# Patient Record
Sex: MALE | Race: WHITE | NOT HISPANIC OR LATINO | Employment: UNEMPLOYED | URBAN - METROPOLITAN AREA
[De-identification: names, ages, dates, MRNs, and addresses within clinical notes are randomized per-mention and may not be internally consistent; named-entity substitution may affect disease eponyms.]

---

## 2022-08-27 ENCOUNTER — OFFICE VISIT (OUTPATIENT)
Dept: URGENT CARE | Facility: URGENT CARE | Age: 47
End: 2022-08-27
Payer: COMMERCIAL

## 2022-08-27 VITALS
DIASTOLIC BLOOD PRESSURE: 82 MMHG | TEMPERATURE: 98.7 F | HEART RATE: 84 BPM | RESPIRATION RATE: 14 BRPM | SYSTOLIC BLOOD PRESSURE: 136 MMHG | OXYGEN SATURATION: 99 %

## 2022-08-27 DIAGNOSIS — H60.391 INFECTIVE OTITIS EXTERNA, RIGHT: Primary | ICD-10-CM

## 2022-08-27 PROCEDURE — 99203 OFFICE O/P NEW LOW 30 MIN: CPT | Performed by: PHYSICIAN ASSISTANT

## 2022-08-27 RX ORDER — NEOMYCIN SULFATE, POLYMYXIN B SULFATE AND HYDROCORTISONE 10; 3.5; 1 MG/ML; MG/ML; [USP'U]/ML
3 SUSPENSION/ DROPS AURICULAR (OTIC) 4 TIMES DAILY
Qty: 10 ML | Refills: 0 | Status: SHIPPED | OUTPATIENT
Start: 2022-08-27 | End: 2023-07-07

## 2022-08-27 NOTE — PROGRESS NOTES
Assessment/Plan:    C/w otitis externa. Rx Cortisporin. Supportive cares discussed, avoid water in the ear.  See patient instructions below.    At the end of the encounter, I discussed results, diagnosis, medications. Discussed red flags for immediate return to clinic/ER, as well as indications for follow up if no improvement. Patient understood and agreed to plan. Patient was stable for discharge.      ICD-10-CM    1. Infective otitis externa, right  H60.391 neomycin-polymyxin-hydrocortisone (CORTISPORIN) 3.5-12788-9 otic suspension         Return in about 3 days (around 8/30/2022).    YOSEPH Lawrence, PA-[facility]  -----------------------------------------------------------------------------------------------------------------------------------------------------    HPI:  Roger Juarez is a 46 year old male who presents for evaluation of R ear pain and decreased hearing onset 2 days ago. He tried acetic acid last night. Patient reports no congestion, ear drainage, fever/chills, headache, nausea, vomiting, diarrhea, rash, or any other symptoms.     Past Medical History:   Diagnosis Date     NO ACTIVE PROBLEMS        Vitals:    08/27/22 1205   BP: 136/82   Pulse: 84   Resp: 14   Temp: 98.7  F (37.1  C)   SpO2: 99%       Physical Exam  Vitals and nursing note reviewed.   HENT:      Right Ear: Drainage (small amount of white material in ear canal), swelling (ear canal, tragus) and tenderness (tragus, auricle) present.      Left Ear: Tympanic membrane and external ear normal.      Ears:      Comments: Unable to visualize right TM due to ear canal swelling/drainage  Pulmonary:      Effort: Pulmonary effort is normal.   Lymphadenopathy:      Head:      Right side of head: Preauricular adenopathy present.   Neurological:      Mental Status: He is alert.         Labs/Imaging:  No results found for this or any previous visit (from the past 24 hour(s)).  No results found for this  or any previous visit (from the past 24 hour(s)).        Patient Instructions   1. Keep ears completely dry during the time that patient is being treated. No swimming. Showering is ok, but keep ears away from the water as much as possible.   2.  Follow-up if no improvement in pain over the course the next 3 to 4 days.  Follow-up sooner if worsening symptoms such as fever develop.  3.  May give Tylenol or ibuprofen as needed for pain control.    External otitis (also called  swimmer s ear ) is an infection in the ear canal. It is often caused by bacteria or fungus. It can occur a few days after water gets trapped in the ear canal (from swimming or bathing). It can also occur after cleaning too deeply in the ear canal with a cotton swab or other object. Sometimes, hair care products get into the ear canal and cause this problem.  Symptoms can include pain, fever, itching, redness, drainage, or swelling of the ear canal. Temporary hearing loss may also occur.  Home care    Do not try to clean the ear canal. This can push pus and bacteria deeper into the canal.    Use prescribed ear drops as directed. These help reduce swelling and fight the infection. If an ear wick was placed in the ear canal, apply drops right onto the end of the wick. The wick will draw the medicine into the ear canal even if it is swollen closed.    A cotton ball may be loosely placed in the outer ear to absorb any drainage.    You may use acetaminophen or ibuprofen to control pain, unless another medicine was prescribed. Note: If you have chronic liver or kidney disease or ever had a stomach ulcer or GI bleeding, talk to your healthcare provider before taking any of these medicines.    Do not allow water to get into your ear when bathing. Also, don't swim until the infection has cleared.  Prevention    Keep your ears dry. This helps lower the risk of infection. Dry your ears with a towel or hair dryer after getting wet. Also, use ear plugs when  swimming.    Do not stick any objects in the ear to remove wax.    If you feel water trapped in your ear, use ear drops right away. You can get these drops over the counter at most drugstores. They work by removing water from the ear canal.  Follow-up care  Follow up with your healthcare provider in 1 week, or as advised.  When to seek medical advice  Call your healthcare provider right away if any of these occur:    Ear pain becomes worse or doesn t improve after 3 days of treatment    Redness or swelling of the outer ear occurs or gets worse    Headache    Painful or stiff neck    Drowsiness or confusion    Fever of 100.4 F (38 C) or higher, or as directed by your healthcare provider    Seizure  Date Last Reviewed: 10/1/2017    5955-1402 [employer]. [address] [place] [address], [place] [address]. All rights reserved. This information is not intended as a substitute for professional medical care. Always follow your healthcare professional's instructions.

## 2022-08-27 NOTE — PATIENT INSTRUCTIONS
Keep ears completely dry during the time that patient is being treated. No swimming. Showering is ok, but keep ears away from the water as much as possible.   2.  Follow-up if no improvement in pain over the course the next 3 to 4 days.  Follow-up sooner if worsening symptoms such as fever develop.  3.  May give Tylenol or ibuprofen as needed for pain control.    External otitis (also called  swimmer s ear ) is an infection in the ear canal. It is often caused by bacteria or fungus. It can occur a few days after water gets trapped in the ear canal (from swimming or bathing). It can also occur after cleaning too deeply in the ear canal with a cotton swab or other object. Sometimes, hair care products get into the ear canal and cause this problem.  Symptoms can include pain, fever, itching, redness, drainage, or swelling of the ear canal. Temporary hearing loss may also occur.  Home care  Do not try to clean the ear canal. This can push pus and bacteria deeper into the canal.  Use prescribed ear drops as directed. These help reduce swelling and fight the infection. If an ear wick was placed in the ear canal, apply drops right onto the end of the wick. The wick will draw the medicine into the ear canal even if it is swollen closed.  A cotton ball may be loosely placed in the outer ear to absorb any drainage.  You may use acetaminophen or ibuprofen to control pain, unless another medicine was prescribed. Note: If you have chronic liver or kidney disease or ever had a stomach ulcer or GI bleeding, talk to your healthcare provider before taking any of these medicines.  Do not allow water to get into your ear when bathing. Also, don't swim until the infection has cleared.  Prevention  Keep your ears dry. This helps lower the risk of infection. Dry your ears with a towel or hair dryer after getting wet. Also, use ear plugs when swimming.  Do not stick any objects in the ear to remove wax.  If you feel water trapped in your  ear, use ear drops right away. You can get these drops over the counter at most drugstores. They work by removing water from the ear canal.  Follow-up care  Follow up with your healthcare provider in 1 week, or as advised.  When to seek medical advice  Call your healthcare provider right away if any of these occur:  Ear pain becomes worse or doesn t improve after 3 days of treatment  Redness or swelling of the outer ear occurs or gets worse  Headache  Painful or stiff neck  Drowsiness or confusion  Fever of 100.4 F (38 C) or higher, or as directed by your healthcare provider  Seizure  Date Last Reviewed: 10/1/2017    2427-9818 [employer] [address] [place] [address], [place] [address]. All rights reserved. This information is not intended as a substitute for professional medical care. Always follow your healthcare professional's instructions.

## 2022-09-02 ENCOUNTER — OFFICE VISIT (OUTPATIENT)
Dept: FAMILY MEDICINE | Facility: CLINIC | Age: 47
End: 2022-09-02
Payer: COMMERCIAL

## 2022-09-02 VITALS
OXYGEN SATURATION: 97 % | HEART RATE: 66 BPM | WEIGHT: 167.7 LBS | TEMPERATURE: 99 F | DIASTOLIC BLOOD PRESSURE: 80 MMHG | SYSTOLIC BLOOD PRESSURE: 124 MMHG | RESPIRATION RATE: 16 BRPM

## 2022-09-02 DIAGNOSIS — B00.1 COLD SORE: ICD-10-CM

## 2022-09-02 DIAGNOSIS — H60.391 INFECTIVE OTITIS EXTERNA, RIGHT: Primary | ICD-10-CM

## 2022-09-02 PROCEDURE — 99213 OFFICE O/P EST LOW 20 MIN: CPT | Performed by: NURSE PRACTITIONER

## 2022-09-02 RX ORDER — VALACYCLOVIR HYDROCHLORIDE 1 G/1
2000 TABLET, FILM COATED ORAL 2 TIMES DAILY
Qty: 4 TABLET | Refills: 3 | Status: SHIPPED | OUTPATIENT
Start: 2022-09-02 | End: 2022-09-03

## 2022-09-02 RX ORDER — CIPROFLOXACIN 500 MG/1
TABLET, FILM COATED ORAL
COMMUNITY
Start: 2022-08-28 | End: 2023-07-07

## 2022-09-02 ASSESSMENT — PAIN SCALES - GENERAL: PAINLEVEL: NO PAIN (0)

## 2022-09-02 NOTE — PATIENT INSTRUCTIONS
Ear canal: use ear drops for one more week    Ear pressure: try ClaritinD (ask the pharmacist for this) and flonase (over the counter nose spray)

## 2022-09-02 NOTE — PROGRESS NOTES
Assessment & Plan     Infective otitis externa, right   note reviewed; at that time canal swelling obstructed view of TM.  Today exam reveals much improved symptoms.  Continues to have mild redness and debris in the canal.  Will have him use drops (previously prescribed) for one more week. Try antihistamine/decongestant & nasal steroid for ear discomfort.  If not resolving can have him follow-up with ENT.     Cold sore  Started 1-2 days ago.  Hasn't had a cold sore in several years; start valtrex.   - valACYclovir (VALTREX) 1000 mg tablet; Take 2 tablets (2,000 mg) by mouth 2 times daily for 1 day           follow-up 3 mos physical     No follow-ups on file.    MARIA LUZ Gillespie CNP  [facility] AYLIN Duran is a 46 year old, presenting for the following health issues:  Ear Problem ( Infective otitis externa, right) and Mouth/Lip Problem (Has a cold sore coming in, requesting a rx for Valtrex)      HPI     ED/ Followup:    Facility:  [facility]  Date of visit: 8/27/2022  Reason for visit: Infective otitis externa, right  Current Status: hasn't cleared up yet  Initially was started on ear drops.  Did the drops for a day, but the next day ear was worse and was switched to oral cipro for 7 days.  Symptoms are better, but still has fullness and maybe swelling in the ear.  Ear feels clogged and has decreased hearing.  Occasional ear pain.       Review of Systems   Constitutional, HEENT, cardiovascular, pulmonary, gi and gu systems are negative, except as otherwise noted.      Objective    /80 (BP Location: Right arm, Patient Position: Sitting, Cuff Size: Adult Regular)   Pulse 66   Temp 99  F (37.2  C) (Oral)   Resp 16   Wt 76.1 kg (167 lb 11.2 oz)   SpO2 97%   There is no height or weight on file to calculate BMI.  Physical Exam   GENERAL: healthy, alert and no distress  EYES: Eyes grossly normal to inspection and conjunctivae and  sclerae normal  HENT: L ear canal and TM's normal, R canal pink, moist, scant white debris, no swelling with clear view of the TM, nose and mouth without ulcers or lesions  NECK: no adenopathy, no asymmetry, masses, or scars and thyroid normal to palpation    No results found for this or any previous visit (from the past 24 hour(s)).                [email or web address]  @[email or web address]@

## 2023-07-07 ENCOUNTER — OFFICE VISIT (OUTPATIENT)
Dept: FAMILY MEDICINE | Facility: CLINIC | Age: 48
End: 2023-07-07
Payer: COMMERCIAL

## 2023-07-07 VITALS
OXYGEN SATURATION: 99 % | HEIGHT: 71 IN | HEART RATE: 72 BPM | RESPIRATION RATE: 13 BRPM | SYSTOLIC BLOOD PRESSURE: 151 MMHG | TEMPERATURE: 98.1 F | WEIGHT: 165 LBS | DIASTOLIC BLOOD PRESSURE: 112 MMHG | BODY MASS INDEX: 23.1 KG/M2

## 2023-07-07 DIAGNOSIS — R63.0 LOSS OF APPETITE: Primary | ICD-10-CM

## 2023-07-07 PROCEDURE — 99213 OFFICE O/P EST LOW 20 MIN: CPT | Performed by: NURSE PRACTITIONER

## 2023-07-07 ASSESSMENT — PAIN SCALES - GENERAL: PAINLEVEL: NO PAIN (0)

## 2023-07-07 NOTE — PROGRESS NOTES
Assessment & Plan     Loss of appetite  3 days, no red flags.  Continue to avoid the THC gummies, unsure if this played a role.  Advance diet as tolerated.  Monitor closely.  Suspicious for viral etiology.  If no improvement over the next few days he will contact us.  Pt agrees with plan and verbalized understanding.      MARIA LUZ Harley Ra, CNP  [facility] AYLIN Duran is a 47 year old, presenting for the following health issues:  Anorexia (Loss of appetite for 3 days)        7/7/2023     9:42 AM   Additional Questions   Roomed by Tawana HAWLEY     History of Present Illness       Reason for visit:  You should already know  Symptom onset:  3-7 days ago    He eats 4 or more servings of fruits and vegetables daily.He consumes 1 sweetened beverage(s) daily.He exercises with enough effort to increase his heart rate 60 or more minutes per day.  He exercises with enough effort to increase his heart rate 5 days per week.   He is taking medications regularly.       Was diagnosed with GERD in the past (decades ago).  Has been eating about 1/3 of breakfast and about 1/2 of his dinner each day since symptoms began.    Concern - Loss of appetite  Onset: 3 days ago  Description: stomach will grumble but won't feel hungry  Intensity: mild  Progression of Symptoms:  intermittent  Accompanying Signs & Symptoms:  fatigued, diarrhea  Previous history of similar problem: no      Loss of appetite over the past 3 days.  Still eating, but much less.  No vomiting or abdominal pain.  No black or bloody stools.  He has had some diarrhea, but also some of his stools have been normal.  He has had around 3 stools per day, baseline is once per day.  Eats a full diet.  Slightly more fatigued.  No known fever but he did wake up in the night 2 nights ago sweating.  No recent travel.  No change in water.  No sick contacts.  He did take some THC gummies just prior to symptoms starting.  Unsure if this is the  "cause.  They were not sealed in any way.  He has since stopped.      Review of Systems   Constitutional, HEENT, cardiovascular, pulmonary, gi and gu systems are negative, except as otherwise noted.      Objective    BP (!) 151/112 (BP Location: Right arm, Patient Position: Sitting, Cuff Size: Adult Regular)   Pulse 72   Temp 98.1  F (36.7  C) (Oral)   Resp 13   Ht 1.803 m (5' 11\")   Wt 74.8 kg (165 lb)   SpO2 99%   BMI 23.01 kg/m    Body mass index is 23.01 kg/m .  Physical Exam   GENERAL: healthy, alert and no distress  RESP: lungs clear to auscultation - no rales, rhonchi or wheezes  CV: regular rate and rhythm, normal S1 S2, no S3 or S4, no murmur, click or rub, no peripheral edema and peripheral pulses strong  ABDOMEN: soft, nontender, no hepatosplenomegaly, no masses and bowel sounds normal  PSYCH: mentation appears normal, affect normal/bright                    "

## 2024-08-03 ENCOUNTER — OFFICE VISIT (OUTPATIENT)
Dept: URGENT CARE | Facility: URGENT CARE | Age: 49
End: 2024-08-03
Payer: COMMERCIAL

## 2024-08-03 VITALS
OXYGEN SATURATION: 99 % | DIASTOLIC BLOOD PRESSURE: 68 MMHG | TEMPERATURE: 98.6 F | SYSTOLIC BLOOD PRESSURE: 90 MMHG | WEIGHT: 165 LBS | BODY MASS INDEX: 23.01 KG/M2 | HEART RATE: 75 BPM

## 2024-08-03 DIAGNOSIS — H60.393 OTHER INFECTIVE ACUTE OTITIS EXTERNA OF BOTH EARS: ICD-10-CM

## 2024-08-03 DIAGNOSIS — H66.002 NON-RECURRENT ACUTE SUPPURATIVE OTITIS MEDIA OF LEFT EAR WITHOUT SPONTANEOUS RUPTURE OF TYMPANIC MEMBRANE: Primary | ICD-10-CM

## 2024-08-03 PROCEDURE — 99213 OFFICE O/P EST LOW 20 MIN: CPT | Performed by: PHYSICIAN ASSISTANT

## 2024-08-03 RX ORDER — NEOMYCIN SULFATE, POLYMYXIN B SULFATE AND HYDROCORTISONE 10; 3.5; 1 MG/ML; MG/ML; [USP'U]/ML
3 SUSPENSION/ DROPS AURICULAR (OTIC) 3 TIMES DAILY
Qty: 10 ML | Refills: 0 | Status: SHIPPED | OUTPATIENT
Start: 2024-08-03 | End: 2024-08-10

## 2024-08-03 NOTE — PROGRESS NOTES
Assessment & Plan     Non-recurrent acute suppurative otitis media of left ear without spontaneous rupture of tympanic membrane  Noted on exam today.  Augmentin is prescribed.  Patient educational information provided regarding course of symptoms.  Tylenol or Motrin as needed for pain/discomfort.  Follow-up if any worsening symptoms.  Patient agrees.  - amoxicillin-clavulanate (AUGMENTIN) 875-125 MG tablet  Dispense: 14 tablet; Refill: 0    Other infective acute otitis externa of both ears  Cortisporin eardrops are prescribed today.  Patient educational information provided regarding course of symptoms.  Follow-up if any worsening symptoms.  Patient agrees.  - neomycin-polymyxin-hydrocortisone (CORTISPORIN) 3.5-05295-8 otic suspension  Dispense: 10 mL; Refill: 0       Return in about 1 week (around 8/10/2024) for Symptoms failing to improve.    Lee Ann Parker PA-C  [facility] DAR Duran is a 48 year old male who presents to clinic today for the following health issues:  Chief Complaint   Patient presents with    Urgent Care     Bilateral ear pain, right ear for the past few days and now today left ear, fatigued, pressure, jaw pain, no meds taking        HPI    Patient is presenting to urgent care today with concern for ear infection.  Patient reports bilateral ear pain.  Right ear pain started first and the left ear pain started since last night.  He otherwise denies fevers, discharge from the ears, sore throat, cough.  Treatment tried: None.      Review of Systems  Constitutional, HEENT, cardiovascular, pulmonary, GI, , musculoskeletal, neuro, skin, endocrine and psych systems are negative, except as otherwise noted.      Objective    BP 90/68   Pulse 75   Temp 98.6  F (37  C) (Tympanic)   Wt 74.8 kg (165 lb)   SpO2 99%   BMI 23.01 kg/m    Physical Exam   GENERAL: alert and no distress  HENT: left ear canal is mildly edematous, left TM is bulging and erythematous,  right TM is normal, right ear canal is erythematous and edematous, tenderness with traction of the right tragus, mouth without ulcers or lesions, throat is moist and pink, uvula is midline  RESP: lungs clear to auscultation - no rales, rhonchi or wheezes  CV: regular rate and rhythm, normal S1 S2  MS: no gross musculoskeletal defects noted, no edema  SKIN: no suspicious lesions or rashes

## 2024-08-08 ENCOUNTER — OFFICE VISIT (OUTPATIENT)
Dept: URGENT CARE | Facility: URGENT CARE | Age: 49
End: 2024-08-08
Payer: COMMERCIAL

## 2024-08-08 VITALS
TEMPERATURE: 98.5 F | BODY MASS INDEX: 23.01 KG/M2 | SYSTOLIC BLOOD PRESSURE: 124 MMHG | WEIGHT: 165 LBS | RESPIRATION RATE: 14 BRPM | DIASTOLIC BLOOD PRESSURE: 80 MMHG | HEART RATE: 80 BPM | OXYGEN SATURATION: 97 %

## 2024-08-08 DIAGNOSIS — J30.2 SEASONAL ALLERGIC RHINITIS, UNSPECIFIED TRIGGER: ICD-10-CM

## 2024-08-08 DIAGNOSIS — H61.21 EXCESSIVE EAR WAX, RIGHT: ICD-10-CM

## 2024-08-08 DIAGNOSIS — K21.00 GASTROESOPHAGEAL REFLUX DISEASE WITH ESOPHAGITIS WITHOUT HEMORRHAGE: ICD-10-CM

## 2024-08-08 DIAGNOSIS — H66.006 RECURRENT ACUTE SUPPURATIVE OTITIS MEDIA WITHOUT SPONTANEOUS RUPTURE OF TYMPANIC MEMBRANE OF BOTH SIDES: Primary | ICD-10-CM

## 2024-08-08 PROCEDURE — 69209 REMOVE IMPACTED EAR WAX UNI: CPT | Mod: RT | Performed by: PHYSICIAN ASSISTANT

## 2024-08-08 PROCEDURE — 99213 OFFICE O/P EST LOW 20 MIN: CPT | Mod: 25 | Performed by: PHYSICIAN ASSISTANT

## 2024-08-08 RX ORDER — FAMOTIDINE 20 MG/1
20 TABLET, FILM COATED ORAL 2 TIMES DAILY
Qty: 60 TABLET | Refills: 0 | Status: SHIPPED | OUTPATIENT
Start: 2024-08-08

## 2024-08-08 RX ORDER — LEVOFLOXACIN 500 MG/1
500 TABLET, FILM COATED ORAL DAILY
Qty: 7 TABLET | Refills: 0 | Status: SHIPPED | OUTPATIENT
Start: 2024-08-08 | End: 2024-08-15

## 2024-08-08 RX ORDER — FLUTICASONE PROPIONATE 50 MCG
1 SPRAY, SUSPENSION (ML) NASAL DAILY
Qty: 16 G | Refills: 1 | Status: SHIPPED | OUTPATIENT
Start: 2024-08-08

## 2024-08-08 ASSESSMENT — ENCOUNTER SYMPTOMS
FEVER: 0
ABDOMINAL PAIN: 0
RHINORRHEA: 1

## 2024-08-08 NOTE — PROGRESS NOTES
Assessment & Plan:        ICD-10-CM    1. Recurrent acute suppurative otitis media without spontaneous rupture of tympanic membrane of both sides  H66.006 levofloxacin (LEVAQUIN) 500 MG tablet      2. Gastroesophageal reflux disease with esophagitis without hemorrhage  K21.00 famotidine (PEPCID) 20 MG tablet      3. Seasonal allergic rhinitis, unspecified trigger  J30.2 fluticasone (FLONASE) 50 MCG/ACT nasal spray      4. Excessive ear wax, right  H61.21 TN REMOVAL IMPACTED CERUMEN IRRIGATION/LVG UNILAT            Plan/Clinical Decision Making:    Patient with bilateral OM persisting despite Augmentin treatment for 5 days.   Will switch to Levaquin. Discussed risks of tendon rupture.     Patient having some GERD symptoms with Augmentin. Will start Pepcid to help with symptoms.     Has had chronic mild allergies. Recommend starting daily Flonase.     Lavage/irrigation done on right ear due to wax. Able to visualize TM afterwards.     Patient Instructions   Quit Augmentin and start Levaquin.     Use Flonase daily.     Use Pepcid as needed twice a day for GERD symptoms.       Return if symptoms worsen or fail to improve, for in 3-5 days.     At the end of the encounter, I discussed results, diagnosis, medications. Discussed red flags for immediate return to clinic/ER, as well as indications for follow up if no improvement. Patient understood and agreed to plan. Patient was stable for discharge.        Allyson Finch PA-C on 8/8/2024 at 12:29 PM          Subjective:     HPI:    Roger is a 48 year old male who presents to clinic today for the following health issues:  Chief Complaint   Patient presents with    Ear Problem     WAS seen last SAT for same and got some meds ---- still having Bilateral Ear issue/pain x 3 weeks     HPI    Has had ear pain for over a week. Treated on Saturday. Patient treated with Augmentin and cortisporin. Still having symptoms.   Having diarrhea and GERD symptoms with Augmentin. Acid  seems to come up to throat.   Also has had mild chronic allergies/rhinitis.     Review of Systems   Constitutional:  Negative for fever.   HENT:  Positive for congestion, ear pain, postnasal drip and rhinorrhea. Negative for ear discharge.    Gastrointestinal:  Negative for abdominal pain.         Patient Active Problem List   Diagnosis    Malaise and fatigue    Enlarged lymph nodes        Past Medical History:   Diagnosis Date    NO ACTIVE PROBLEMS        Social History     Tobacco Use    Smoking status: Never    Smokeless tobacco: Never   Substance Use Topics    Alcohol use: Yes             Objective:     Vitals:    08/08/24 1210   BP: 124/80   BP Location: Right arm   Patient Position: Chair   Cuff Size: Adult Regular   Pulse: 80   Resp: 14   Temp: 98.5  F (36.9  C)   TempSrc: Oral   SpO2: 97%   Weight: 74.8 kg (165 lb)         Physical Exam   EXAM:   Pleasant, alert, appropriate appearance. NAD.  Head Exam: Normocephalic, atraumatic.  Eye Exam:  PERRLA, EOMI, non icteric/injection.    Ear Exam: Right TM with mild wax, after lavage able to visualize both Tms.  Bilateral TMs with bulging. Normal canals.  Normal pinna. Right TM with erythema, Left TM with serous appearance.   Nose Exam: Normal external nose.    OroPharynx Exam:  Moist mucous membranes. No erythema, pharynx without exudate or hypertrophy.  Neck/Thyroid Exam:  No LAD.     Results:  No results found for any visits on 08/08/24.

## 2024-08-08 NOTE — PATIENT INSTRUCTIONS
Quit Augmentin and start Levaquin.     Use Flonase daily.     Use Pepcid as needed twice a day for GERD symptoms.

## 2024-08-20 ENCOUNTER — OFFICE VISIT (OUTPATIENT)
Dept: URGENT CARE | Facility: URGENT CARE | Age: 49
End: 2024-08-20
Payer: COMMERCIAL

## 2024-08-20 VITALS
TEMPERATURE: 97.5 F | SYSTOLIC BLOOD PRESSURE: 130 MMHG | WEIGHT: 165 LBS | HEIGHT: 71 IN | RESPIRATION RATE: 16 BRPM | DIASTOLIC BLOOD PRESSURE: 86 MMHG | OXYGEN SATURATION: 98 % | BODY MASS INDEX: 23.1 KG/M2 | HEART RATE: 90 BPM

## 2024-08-20 DIAGNOSIS — B00.1 COLD SORE: Primary | ICD-10-CM

## 2024-08-20 PROCEDURE — 99213 OFFICE O/P EST LOW 20 MIN: CPT | Performed by: PHYSICIAN ASSISTANT

## 2024-08-20 RX ORDER — VALACYCLOVIR HYDROCHLORIDE 500 MG/1
500 TABLET, FILM COATED ORAL 2 TIMES DAILY
Qty: 6 TABLET | Refills: 0 | Status: SHIPPED | OUTPATIENT
Start: 2024-08-20 | End: 2024-08-23

## 2024-08-20 NOTE — PROGRESS NOTES
"  Assessment & Plan:      Problem List Items Addressed This Visit    None  Visit Diagnoses       Cold sore    -  Primary    Relevant Medications    valACYclovir (VALTREX) 500 MG tablet          Medical Decision Making  Patient presents with a recurrent cold sore flareup.  Recommend oral antivirals.  Discussed treatment and symptomatic care.  Allergies and medication interactions reviewed.  Discussed signs of worsening symptoms and when to follow-up with PCP if no symptom improvement.     Subjective:      Roger Juarez is a 48 year old male here for evaluation of cold sores.  Patient has history of cold sores which usually flareup every couple years.  Current flareup started 24 hours ago and is affecting the upper lip.  Patient recently recovered from an ear infection.     The following portions of the patient's history were reviewed and updated as appropriate: allergies, current medications, and problem list.     Review of Systems  Pertinent items are noted in HPI.    Allergies  Allergies   Allergen Reactions    Sulfa Antibiotics        Family History   Problem Relation Age of Onset    Family History Negative Unknown        Social History     Tobacco Use    Smoking status: Never    Smokeless tobacco: Never   Substance Use Topics    Alcohol use: Yes        Objective:      /86   Pulse 90   Temp 97.5  F (36.4  C) (Temporal)   Resp 16   Ht 1.803 m (5' 11\")   Wt 74.8 kg (165 lb)   SpO2 98%   BMI 23.01 kg/m    General appearance - alert, well appearing, and in no distress and non-toxic  Mouth - superficial lightly erythematous blistering scabs affecting the upper lip, no other sores lesions affecting the tongue, buccal mucosa, or tonsils    The use of Dragon/[employer] dictation services was used to construct the content of this note; any grammatical errors are non-intentional. Please contact the author directly if you are in need of any clarification.     "

## 2025-06-17 ENCOUNTER — OFFICE VISIT (OUTPATIENT)
Dept: URGENT CARE | Facility: URGENT CARE | Age: 50
End: 2025-06-17
Payer: COMMERCIAL

## 2025-06-17 VITALS
DIASTOLIC BLOOD PRESSURE: 72 MMHG | WEIGHT: 157 LBS | HEIGHT: 71 IN | TEMPERATURE: 98.6 F | SYSTOLIC BLOOD PRESSURE: 124 MMHG | RESPIRATION RATE: 16 BRPM | HEART RATE: 73 BPM | BODY MASS INDEX: 21.98 KG/M2 | OXYGEN SATURATION: 98 %

## 2025-06-17 DIAGNOSIS — H66.001 NON-RECURRENT ACUTE SUPPURATIVE OTITIS MEDIA OF RIGHT EAR WITHOUT SPONTANEOUS RUPTURE OF TYMPANIC MEMBRANE: Primary | ICD-10-CM

## 2025-06-17 DIAGNOSIS — H60.391 INFECTIVE OTITIS EXTERNA, RIGHT: ICD-10-CM

## 2025-06-17 PROCEDURE — 3078F DIAST BP <80 MM HG: CPT | Performed by: PHYSICIAN ASSISTANT

## 2025-06-17 PROCEDURE — 99213 OFFICE O/P EST LOW 20 MIN: CPT | Performed by: PHYSICIAN ASSISTANT

## 2025-06-17 PROCEDURE — 3074F SYST BP LT 130 MM HG: CPT | Performed by: PHYSICIAN ASSISTANT

## 2025-06-17 RX ORDER — CIPROFLOXACIN AND DEXAMETHASONE 3; 1 MG/ML; MG/ML
4 SUSPENSION/ DROPS AURICULAR (OTIC) 2 TIMES DAILY
Qty: 7.5 ML | Refills: 0 | Status: SHIPPED | OUTPATIENT
Start: 2025-06-17 | End: 2025-06-24

## 2025-06-17 RX ORDER — CEFDINIR 300 MG/1
300 CAPSULE ORAL 2 TIMES DAILY
Qty: 20 CAPSULE | Refills: 0 | Status: SHIPPED | OUTPATIENT
Start: 2025-06-17 | End: 2025-06-27

## 2025-06-17 NOTE — PROGRESS NOTES
"Assessment & Plan     Non-recurrent acute suppurative otitis media of right ear without spontaneous rupture of tympanic membrane  Noted on exam today.  Patient had similar symptoms last summer and Augmentin took a while to help resolve symptoms. Cefdinir is prescribed today.  Tylenol/Motrin as needed for pain.  Close monitoring of symptoms.  Follow-up if any worsening symptoms.  Patient agrees.  - cefdinir (OMNICEF) 300 MG capsule  Dispense: 20 capsule; Refill: 0    Infective otitis externa, right  Ciprodex prescribed today.  Close monitoring of symptoms.  Follow-up if any worsening symptoms.  Patient agrees.  - ciprofloxacin-dexAMETHasone (CIPRODEX) 0.3-0.1 % otic suspension  Dispense: 7.5 mL; Refill: 0       Return in about 5 days (around 6/22/2025) for Symptoms failing to improve.    Lee Ann Parker PA-C  [facility] [place]    Teodoro Duran is a 49 year old male who presents to clinic today for the following health issues:  Chief Complaint   Patient presents with    Urgent Care     Ear pain right side x 1 day          6/17/2025     3:37 PM   Additional Questions   Roomed by LS   Accompanied by self     HPI    Patient is presenting to urgent care today with a complaint of right ear pain since yesterday.  No ear discharge noted.  No fever.  No recent cold symptoms.  Patient had a similar episode last summer, treated with Cortisporin and Augmentin, symptoms did not improve and he was reevaluated again 5 days later and prescribed Levaquin.  He tells me today he did not take the Levaquin but symptoms eventually got better.    Review of Systems  Constitutional, HEENT, cardiovascular, pulmonary, GI, , musculoskeletal, neuro, skin, endocrine and psych systems are negative, except as otherwise noted.      Objective    /72   Pulse 73   Temp 98.6  F (37  C) (Oral)   Resp 16   Ht 1.803 m (5' 11\")   Wt 71.2 kg (157 lb)   SpO2 98%   BMI 21.90 kg/m    Physical Exam   GENERAL: alert " and no distress  HENT: left ear canal and TM normal, right ear canal is erythematous and erythematous, tenderness with traction of the right tragus, right TM is bulging and erythematous, throat is moist and pink  RESP: lungs clear to auscultation - no rales, rhonchi or wheezes  CV: regular rate and rhythm, normal S1 S2  MS: no gross musculoskeletal defects noted, no edema

## 2025-06-17 NOTE — PROGRESS NOTES
Urgent Care Clinic Visit    Chief Complaint   Patient presents with    Urgent Care     Ear pain right side x 1 day                6/17/2025     3:37 PM   Additional Questions   Roomed by LS   Accompanied by self

## 2025-07-30 ENCOUNTER — OFFICE VISIT (OUTPATIENT)
Dept: FAMILY MEDICINE | Facility: CLINIC | Age: 50
End: 2025-07-30
Payer: COMMERCIAL

## 2025-07-30 VITALS
HEART RATE: 81 BPM | DIASTOLIC BLOOD PRESSURE: 77 MMHG | RESPIRATION RATE: 12 BRPM | BODY MASS INDEX: 21.42 KG/M2 | TEMPERATURE: 97.8 F | OXYGEN SATURATION: 98 % | HEIGHT: 71 IN | WEIGHT: 153 LBS | SYSTOLIC BLOOD PRESSURE: 125 MMHG

## 2025-07-30 DIAGNOSIS — M67.40 GANGLION CYST: ICD-10-CM

## 2025-07-30 DIAGNOSIS — H66.90 RECURRENT AOM (ACUTE OTITIS MEDIA): ICD-10-CM

## 2025-07-30 DIAGNOSIS — Z00.00 ROUTINE GENERAL MEDICAL EXAMINATION AT A HEALTH CARE FACILITY: Primary | ICD-10-CM

## 2025-07-30 DIAGNOSIS — Z12.11 SCREEN FOR COLON CANCER: ICD-10-CM

## 2025-07-30 DIAGNOSIS — Z13.1 SCREENING FOR DIABETES MELLITUS: ICD-10-CM

## 2025-07-30 DIAGNOSIS — R21 RASH: ICD-10-CM

## 2025-07-30 DIAGNOSIS — Z13.220 SCREENING FOR HYPERLIPIDEMIA: ICD-10-CM

## 2025-07-30 PROCEDURE — 3074F SYST BP LT 130 MM HG: CPT | Performed by: STUDENT IN AN ORGANIZED HEALTH CARE EDUCATION/TRAINING PROGRAM

## 2025-07-30 PROCEDURE — 90715 TDAP VACCINE 7 YRS/> IM: CPT | Performed by: STUDENT IN AN ORGANIZED HEALTH CARE EDUCATION/TRAINING PROGRAM

## 2025-07-30 PROCEDURE — 99214 OFFICE O/P EST MOD 30 MIN: CPT | Mod: 25 | Performed by: STUDENT IN AN ORGANIZED HEALTH CARE EDUCATION/TRAINING PROGRAM

## 2025-07-30 PROCEDURE — 90471 IMMUNIZATION ADMIN: CPT | Performed by: STUDENT IN AN ORGANIZED HEALTH CARE EDUCATION/TRAINING PROGRAM

## 2025-07-30 PROCEDURE — 3078F DIAST BP <80 MM HG: CPT | Performed by: STUDENT IN AN ORGANIZED HEALTH CARE EDUCATION/TRAINING PROGRAM

## 2025-07-30 PROCEDURE — 1126F AMNT PAIN NOTED NONE PRSNT: CPT | Performed by: STUDENT IN AN ORGANIZED HEALTH CARE EDUCATION/TRAINING PROGRAM

## 2025-07-30 PROCEDURE — 99396 PREV VISIT EST AGE 40-64: CPT | Mod: 25 | Performed by: STUDENT IN AN ORGANIZED HEALTH CARE EDUCATION/TRAINING PROGRAM

## 2025-07-30 RX ORDER — KETOCONAZOLE 20 MG/ML
SHAMPOO, SUSPENSION TOPICAL
Qty: 120 ML | Refills: 0 | Status: SHIPPED | OUTPATIENT
Start: 2025-07-30

## 2025-07-30 SDOH — HEALTH STABILITY: PHYSICAL HEALTH: ON AVERAGE, HOW MANY MINUTES DO YOU ENGAGE IN EXERCISE AT THIS LEVEL?: 60 MIN

## 2025-07-30 SDOH — HEALTH STABILITY: PHYSICAL HEALTH: ON AVERAGE, HOW MANY DAYS PER WEEK DO YOU ENGAGE IN MODERATE TO STRENUOUS EXERCISE (LIKE A BRISK WALK)?: 4 DAYS

## 2025-07-30 ASSESSMENT — SOCIAL DETERMINANTS OF HEALTH (SDOH): HOW OFTEN DO YOU GET TOGETHER WITH FRIENDS OR RELATIVES?: ONCE A WEEK

## 2025-07-30 ASSESSMENT — PAIN SCALES - GENERAL: PAINLEVEL_OUTOF10: NO PAIN (0)

## 2025-07-30 NOTE — PROGRESS NOTES
Prior to immunization administration, verified patients identity using patient s name and date of birth. Please see Immunization Activity for additional information.     Screening Questionnaire for Adult Immunization    Are you sick today?   No   Do you have allergies to medications, food, a vaccine component or latex?   No   Have you ever had a serious reaction after receiving a vaccination?   No   Do you have a long-term health problem with heart, lung, kidney, or metabolic disease (e.g., diabetes), asthma, a blood disorder, no spleen, complement component deficiency, a cochlear implant, or a spinal fluid leak?  Are you on long-term aspirin therapy?   No   Do you have cancer, leukemia, HIV/AIDS, or any other immune system problem?   No   Do you have a parent, brother, or sister with an immune system problem?   No   In the past 3 months, have you taken medications that affect  your immune system, such as prednisone, other steroids, or anticancer drugs; drugs for the treatment of rheumatoid arthritis, Crohn s disease, or psoriasis; or have you had radiation treatments?   No   Have you had a seizure, or a brain or other nervous system problem?   No   During the past year, have you received a transfusion of blood or blood    products, or been given immune (gamma) globulin or antiviral drug?   No   For women: Are you pregnant or is there a chance you could become       pregnant during the next month?   No   Have you received any vaccinations in the past 4 weeks?   No     Immunization questionnaire answers were all negative.      Patient instructed to remain in clinic for 15 minutes afterwards, and to report any adverse reactions.     Screening performed by Marie Reis MA on 7/30/2025 at 9:12 AM.

## 2025-07-30 NOTE — PROGRESS NOTES
Preventive Care Visit  [facility]  Fercho Grayson MD, Family Medicine  Jul 30, 2025    Assessment & Plan     Routine general medical examination at a health care facility  Doing well overall. Due for fasting glucose and lipid, obtained. Due for CRC screening, referred for colonoscopy. Discussed vaccination recommendations.    Screening for hyperlipidemia  Not on pharmacotherapy.  - Lipid panel reflex to direct LDL Non-fasting    Screening for diabetes mellitus  - Glucose    Screen for colon cancer  - Colonoscopy Screening  Referral    Rash  Seborrheic dermatitis vs. scalp psoriasis. Plan to trial ketoconazole shampoo. Adding topical high-potency fluocinolone due to significant pruritus and inflammation. If no improvement, refer to Dermatology.    - ketoconazole (NIZORAL) 2 % external shampoo  Dispense: 120 mL; Refill: 0    Ganglion cyst  Benign. Discussed. Consider referral to Ortho if bothersome, changes occur, patient desires treatment.     Recurrent AOM (acute otitis media)  Noted to have recurrent AOM and otitis externa in the past couple of years. Patient notes predominance to R ear but per chart review, has occurred bilaterally. No obvious psoriatic rash in EAC bilaterally. Bilateral OME on exam today but asymptomatic. If recurrent issues, refer to ENT.    Counseling  Appropriate preventive services were addressed with this patient via screening, questionnaire, or discussion as appropriate for fall prevention, nutrition, physical activity, Tobacco-use cessation, social engagement, weight loss and cognition.  Checklist reviewing preventive services available has been given to the patient.  Reviewed patient's diet, addressing concerns and/or questions.   The patient was instructed to see the dentist every 6 months.     Follow up in one year, earlier as needed    Fercho Grayson MD  [facility]  7/31/2025      Teodoro Duran is a 49 year old, presenting for the  following:  Establish Care        7/30/2025     8:15 AM   Additional Questions   Roomed by Tawana Roger [facility]        Would like to establish care.    HPI    Dandruff  Ongoing for a long time, getting worse with growing hair out. Is super itchy.  Has been using head and shoulder every day, sometimes even twice daily.   Not much success with this. Dandruff seems to be in patches.     Left finger lump  Noticed this about 6 months ago in the winter time.   Located in left 4th finger palm aspect of MCP  Noticed when he was wearing gloves with ridge near the finger lump.   Not getting any bigger. Is tender only when he is lifting weights and the bar is resting on the lump.     Recurrent ear infections  Seems to be getting more frequent ear infections of the past several years.  Not related to swimming. No foreign bodies in the ear at all.    Was drinking heavily. Was 3-4 drinks per night. Progressively worsened over the past couple of years.   Quit in the winter or spring of 2025. Not drinking at all right now.     Advance Care Planning  Discussed advance care planning with patient; however, patient declined at this time.        7/30/2025   General Health   How would you rate your overall physical health? Excellent   Feel stress (tense, anxious, or unable to sleep) Not at all         7/30/2025   Nutrition   Three or more servings of calcium each day? Yes   Diet: Regular (no restrictions)   How many servings of fruit and vegetables per day? (!) 2-3   How many sweetened beverages each day? 0-1         7/30/2025   Exercise   Days per week of moderate/strenous exercise 4 days   Average minutes spent exercising at this level 60 min   Weight training, biking and hiking.         7/30/2025   Social Factors   Frequency of gathering with friends or relatives Once a week   Worry food won't last until get money to buy more No   Food not last or not have enough money for food? No   Do you have housing? (Housing is defined as stable  "permanent housing and does not include staying outside in a car, in a tent, in an abandoned building, in an overnight shelter, or couch-surfing.) Yes   Are you worried about losing your housing? No   Lack of transportation? No   Unable to get utilities (heat,electricity)? No         7/30/2025   Dental   Dentist two times every year? (!) NO     Today's PHQ-2 Score:       7/30/2025     8:28 AM   PHQ-2 ( 1999 [employer])   Q1: Little interest or pleasure in doing things 0    Q2: Feeling down, depressed or hopeless 0    PHQ-2 Score 0    Q1: Little interest or pleasure in doing things Not at all   Q2: Feeling down, depressed or hopeless Not at all   PHQ-2 Score 0       Proxy-reported         7/30/2025   Substance Use   Alcohol more than 3/day or more than 7/wk No   Do you use any other substances recreationally? No     Social History     Tobacco Use    Smoking status: Never    Smokeless tobacco: Never   Vaping Use    Vaping status: Former    Substances: Nicotine    Devices: [email or web address] tank   Substance Use Topics    Alcohol use: Not Currently    Drug use: Not Currently     Types: Marijuana     Comment: THC infrequent         7/30/2025   STI Screening   New sexual partner(s) since last STI/HIV test? No   ASCVD Risk   The ASCVD Risk score (Maria Del Rosario LARES, et al., 2019) failed to calculate for the following reasons:    Cannot find a previous HDL lab    Cannot find a previous total cholesterol lab        7/30/2025   Contraception/Family Planning   Questions about contraception or family planning No     Reviewed and updated as needed this visit by Provider   Tobacco  Allergies  Meds   Med Hx  Surg Hx  Fam Hx               Objective    Exam  /77 (BP Location: Right arm, Patient Position: Sitting, Cuff Size: Adult Regular)   Pulse 81   Temp 97.8  F (36.6  C) (Oral)   Resp 12   Ht 1.803 m (5' 11\")   Wt 69.4 kg (153 lb)   SpO2 98%   BMI 21.34 kg/m     Estimated body mass index is 21.34 kg/m  as calculated from " "the following:    Height as of this encounter: 1.803 m (5' 11\").    Weight as of this encounter: 69.4 kg (153 lb).    Physical Exam  GENERAL: healthy, alert and no distress  HEAD: Normocephalic, atraumatic. Two ~5cm erythematous plaques over scalp with mild scaling and excoriations.   EYES: PERRL. Normal conjunctivae, sclera.   ENT: Normal EAC bilaterally. TM with effusion bilaterally. No bulging, not opaque, no erythema.  Normal oropharynx.   NECK: Supple. No lymphadenopathy appreciated. Trachea midline. Thyroid not enlarged, not TTP.  RESP: lungs clear to auscultation - no rales, rhonchi or wheezes  CV: regular rate and rhythm, normal S1 S2, no murmur, click, rub or gallop.  No peripheral swelling noted.   ABDOMEN: soft, no TTP x4 quadrants. No hepatomegaly or masses appreciated. BS normactive.  MSK: LEFT HAND: 4mm mobile subcutaneous nodule located on dorsal aspect of 4th MCP. No significant TTP. No erythema, warmth, edema. Transilluminates.   SKIN: Two 2-3mm erythematous, scaling papules over R extensor surface of elbow. No fingernail pitting.  EXT: Warm and well perfused. DP pulses 2+ bilaterally.  NEURO: CNII-XII grossly intact. No focal deficits.  PSYCH: Groomed, dressed appropriately for weather.  Logical, linear thought process. Normal mood with consistent affect.     Signed Electronically by: Fercho Grayson MD  "

## 2025-07-30 NOTE — PATIENT INSTRUCTIONS
"Patient Education   Preventive Care Advice   This is general advice we often give to help people stay healthy. Your care team may have specific advice just for you. Please talk to your care team about your own preventive care needs.  Lifestyle  Exercise at least 150 minutes each week (30 minutes a day, 5 days a week).  Do muscle strengthening activities 2 days a week. These help control your weight and prevent disease.  No smoking.  Wear sunscreen to prevent skin cancer.  Take time with family and friends.  Have your home tested for radon every 2 to 5 years. Radon is a colorless, odorless gas that can harm your lungs. To learn more, go to www.health.[facility].mn.us and search for \"Radon in Homes.\"  Keep guns unloaded and locked up in a safe place like a safe or gun vault, or, use a gun lock and hide the keys. Always lock away bullets separately. To learn more, visit [employer].[place].gov and search for \"safe gun storage.\"  Nutrition  Eat 5 or more servings of fruits and vegetables each day.  Try wheat bread, brown rice and whole grain pasta (instead of white bread, rice, and pasta).  Get enough calcium and vitamin D. Check the label on foods and aim for 100% of the RDA (recommended daily allowance).  Regular exams  Have a dental exam and cleaning every 6 months.  Older adults: Ask your care team how often to have memory testing.  See your health care team every year to talk about:  Any changes in your health.  Any medicines your care team has prescribed.  Preventive care, family planning, and ways to prevent chronic diseases.  Shots (vaccines)   HPV shots (up to age 26), if you've never had them before.  Hepatitis B shots (up to age 59), if you've never had them before.  COVID-19 shot: Get this shot when it's due.  Flu shot: Get a flu shot every year.  Tetanus shot: Get a tetanus shot every 10 years.  Pneumococcal, hepatitis A, and RSV shots: Ask your care team if you need these based on your risk.  Shingles shot (for age 50 and " up).  General health tests  Diabetes screening:  Starting at age 35, Get screened for diabetes at least every 3 years.  If you are younger than age 35, ask your care team if you should be screened for diabetes.  Cholesterol test: At age 39, start having a cholesterol test every 5 years, or more often if advised.  Bone density scan (DEXA): At age 50, ask your care team if you should have this scan for osteoporosis (brittle bones).  Hepatitis C: Get tested at least once in your life.  Abdominal aortic aneurysm screening: Talk to your doctor about having this screening if you:  Have ever smoked; and  Are biologically male; and  Are between the ages of 65 and 75.  STIs (sexually transmitted infections)  Before age 24: Ask your care team if you should be screened for STIs.  After age 24: Get screened for STIs if you're at risk. You are at risk for STIs (including HIV) if:  You are sexually active with more than one person.  You don't use condoms every time.  You or a partner was diagnosed with a sexually transmitted infection.  If you are at risk for HIV, ask about PrEP medicine to prevent HIV.  Get tested for HIV at least once in your life, whether you are at risk for HIV or not.  Cancer screening tests  Cervical cancer screening: If you have a cervix, begin getting regular cervical cancer screening tests at age 21. Most people who have regular screenings with normal results can stop after age 65. Talk about this with your provider.  Breast cancer scan (mammogram): If you've ever had breasts, begin having regular mammograms starting at age 40. This is a scan to check for breast cancer.  Colon cancer screening: It is important to start screening for colon cancer at age 45.  Have a colonoscopy test every 10 years (or more often if you're at risk) Or, ask your provider about stool tests like a FIT test every year or Cologuard test every 3 years.  To learn more about your testing options, visit:  www.[employer]/604932.pdf.  For help making a decision, visit: tiana.jai/vm03303.  Prostate cancer screening test: If you have a prostate and are age 55 to 69, ask your provider if you would benefit from a yearly prostate cancer screening test.  Lung cancer screening: If you are a current or former smoker age 50 to 80, ask your care team if ongoing lung cancer screenings are right for you.    For informational purposes only. Not to replace the advice of your health care provider. Copyright   2023 [facility]. All rights reserved. Clinically reviewed by the [facility] Transitions Program. [employer] [id number] - REV 6/25.

## 2025-07-31 RX ORDER — FLUOCINONIDE 0.5 MG/G
OINTMENT TOPICAL
Qty: 15 G | Refills: 0 | Status: SHIPPED | OUTPATIENT
Start: 2025-07-31

## 2025-07-31 NOTE — PROGRESS NOTES
Pt calling back relayed message about scalp ointment. Pt wanting to transfer pharmacies. Let him know to call his preferred pharmacy and they can transfer it. Pt understands    Alexandra Gutierres RN   [facility]

## 2025-07-31 NOTE — PROGRESS NOTES
Called patient, left voicemail, and asked patient to call back. Attempt # 1.     Arpita Easton RN 7/31/2025  [facility]

## 2025-08-06 ENCOUNTER — LAB (OUTPATIENT)
Dept: LAB | Facility: CLINIC | Age: 50
End: 2025-08-06
Payer: COMMERCIAL

## 2025-08-06 DIAGNOSIS — Z13.1 SCREENING FOR DIABETES MELLITUS: ICD-10-CM

## 2025-08-06 DIAGNOSIS — Z13.220 SCREENING FOR HYPERLIPIDEMIA: ICD-10-CM

## 2025-08-06 LAB
CHOLEST SERPL-MCNC: 231 MG/DL
FASTING STATUS PATIENT QL REPORTED: YES
FASTING STATUS PATIENT QL REPORTED: YES
GLUCOSE SERPL-MCNC: 102 MG/DL (ref 70–99)
HDLC SERPL-MCNC: 71 MG/DL
LDLC SERPL CALC-MCNC: 146 MG/DL
NONHDLC SERPL-MCNC: 160 MG/DL
TRIGL SERPL-MCNC: 70 MG/DL

## 2025-08-06 PROCEDURE — 82947 ASSAY GLUCOSE BLOOD QUANT: CPT

## 2025-08-06 PROCEDURE — 80061 LIPID PANEL: CPT

## 2025-08-06 PROCEDURE — 36415 COLL VENOUS BLD VENIPUNCTURE: CPT

## 2025-08-07 ENCOUNTER — TELEPHONE (OUTPATIENT)
Dept: GASTROENTEROLOGY | Facility: CLINIC | Age: 50
End: 2025-08-07
Payer: COMMERCIAL

## 2025-08-07 ENCOUNTER — RESULTS FOLLOW-UP (OUTPATIENT)
Dept: FAMILY MEDICINE | Facility: CLINIC | Age: 50
End: 2025-08-07
Payer: COMMERCIAL